# Patient Record
Sex: FEMALE | Race: WHITE | NOT HISPANIC OR LATINO
[De-identification: names, ages, dates, MRNs, and addresses within clinical notes are randomized per-mention and may not be internally consistent; named-entity substitution may affect disease eponyms.]

---

## 2021-03-12 PROBLEM — Z00.00 ENCOUNTER FOR PREVENTIVE HEALTH EXAMINATION: Status: ACTIVE | Noted: 2021-03-12

## 2021-03-29 ENCOUNTER — RESULT CHARGE (OUTPATIENT)
Age: 31
End: 2021-03-29

## 2021-03-29 ENCOUNTER — APPOINTMENT (OUTPATIENT)
Age: 31
End: 2021-03-29
Payer: MEDICAID

## 2021-03-29 VITALS
SYSTOLIC BLOOD PRESSURE: 124 MMHG | HEIGHT: 64 IN | DIASTOLIC BLOOD PRESSURE: 78 MMHG | BODY MASS INDEX: 30.39 KG/M2 | WEIGHT: 178 LBS

## 2021-03-29 DIAGNOSIS — Z82.49 FAMILY HISTORY OF ISCHEMIC HEART DISEASE AND OTHER DISEASES OF THE CIRCULATORY SYSTEM: ICD-10-CM

## 2021-03-29 DIAGNOSIS — Z80.3 FAMILY HISTORY OF MALIGNANT NEOPLASM OF BREAST: ICD-10-CM

## 2021-03-29 DIAGNOSIS — R35.0 FREQUENCY OF MICTURITION: ICD-10-CM

## 2021-03-29 DIAGNOSIS — Z80.0 FAMILY HISTORY OF MALIGNANT NEOPLASM OF DIGESTIVE ORGANS: ICD-10-CM

## 2021-03-29 DIAGNOSIS — R35.1 NOCTURIA: ICD-10-CM

## 2021-03-29 DIAGNOSIS — Z86.59 PERSONAL HISTORY OF OTHER MENTAL AND BEHAVIORAL DISORDERS: ICD-10-CM

## 2021-03-29 DIAGNOSIS — Z87.09 PERSONAL HISTORY OF OTHER DISEASES OF THE RESPIRATORY SYSTEM: ICD-10-CM

## 2021-03-29 DIAGNOSIS — E28.2 POLYCYSTIC OVARIAN SYNDROME: ICD-10-CM

## 2021-03-29 DIAGNOSIS — N85.4 MALPOSITION OF UTERUS: ICD-10-CM

## 2021-03-29 DIAGNOSIS — N39.0 URINARY TRACT INFECTION, SITE NOT SPECIFIED: ICD-10-CM

## 2021-03-29 DIAGNOSIS — Z83.3 FAMILY HISTORY OF DIABETES MELLITUS: ICD-10-CM

## 2021-03-29 DIAGNOSIS — Z83.49 FAMILY HISTORY OF OTHER ENDOCRINE, NUTRITIONAL AND METABOLIC DISEASES: ICD-10-CM

## 2021-03-29 DIAGNOSIS — R30.0 DYSURIA: ICD-10-CM

## 2021-03-29 DIAGNOSIS — Z86.39 PERSONAL HISTORY OF OTHER ENDOCRINE, NUTRITIONAL AND METABOLIC DISEASE: ICD-10-CM

## 2021-03-29 LAB
BILIRUB UR QL STRIP: NEGATIVE
CLARITY UR: CLEAR
COLLECTION METHOD: NORMAL
GLUCOSE UR-MCNC: NEGATIVE
HCG UR QL: 0.2 EU/DL
HGB UR QL STRIP.AUTO: NEGATIVE
KETONES UR-MCNC: NEGATIVE
LEUKOCYTE ESTERASE UR QL STRIP: NEGATIVE
NITRITE UR QL STRIP: NEGATIVE
PH UR STRIP: 7
PROT UR STRIP-MCNC: NEGATIVE
SP GR UR STRIP: 1.01

## 2021-03-29 PROCEDURE — 51701 INSERT BLADDER CATHETER: CPT

## 2021-03-29 PROCEDURE — 99203 OFFICE O/P NEW LOW 30 MIN: CPT | Mod: 25

## 2021-03-29 PROCEDURE — 99072 ADDL SUPL MATRL&STAF TM PHE: CPT

## 2021-03-29 RX ORDER — NITROFURANTOIN MACROCRYSTALS 50 MG/1
50 CAPSULE ORAL
Qty: 30 | Refills: 5 | Status: ACTIVE | COMMUNITY
Start: 2021-03-29 | End: 1900-01-01

## 2021-03-29 RX ORDER — SERTRALINE HYDROCHLORIDE 100 MG/1
100 TABLET, FILM COATED ORAL
Qty: 45 | Refills: 0 | Status: ACTIVE | COMMUNITY
Start: 2021-03-25

## 2021-03-29 RX ORDER — FEXOFENADINE HYDROCHLORIDE AND PSEUDOEPHEDRINE HYDROCHLORIDE 180; 240 MG/1; MG/1
TABLET, FILM COATED, EXTENDED RELEASE ORAL
Refills: 0 | Status: ACTIVE | COMMUNITY

## 2021-03-29 RX ORDER — CRANBERRY FRUIT EXTRACT 650 MG
CAPSULE ORAL
Refills: 0 | Status: ACTIVE | COMMUNITY

## 2021-03-29 NOTE — PHYSICAL EXAM
[Chaperone Present] : A chaperone was present in the examining room during all aspects of the physical examination [No Acute Distress] : in no acute distress [Well developed] : well developed [Well Nourished] : ~L well nourished [Oriented x3] : oriented to person, place, and time [Normal Memory] : ~T memory was ~L unimpaired [Normal Mood/Affect] : mood and affect are normal [Warm and Dry] : was warm and dry to touch [Normal Gait] : gait was normal [Labia Majora] : were normal [Labia Minora] : were normal [Normal Appearance] : general appearance was normal [No Bleeding] : there was no active vaginal bleeding [2] : 2 [Normal] : no abnormalities [Cough] : no cough [Tenderness] : ~T no ~M abdominal tenderness observed [Distended] : not distended [Inguinal LAD] : no adenopathy was noted in the inguinal lymph nodes [FreeTextEntry3] : neg CST [FreeTextEntry4] : no qtip tenderness, no levator spasm  [de-identified] : no prolapse

## 2021-03-29 NOTE — HISTORY OF PRESENT ILLNESS
[Cystocele (Obstetric)] : no [Vaginal Wall Prolapse] : no [Rectal Prolapse] : no [Urge Incontinence Of Urine] : no [Unable To Restrain Bowel Movement] : no [x1] : nocturia once nightly [Feelings Of Urinary Urgency] : no [Pain During Urination (Dysuria)] : no [Urinary Tract Infection] : mild [Constipation Obstructed Defecation] : no [] : no [Incomplete Emptying Of Stool] : no [Pelvic Pain] : no [Vaginal Pain] : no [Vulvar Pain] : no [Bladder Pain] : no [FreeTextEntry1] : \par Madelyn presents for evaluation of recurrent UTI, she reports she has UTI symptoms including frequency/urgency she will take azo and it will go away, a few days later she will feel like she gets one, she reports a small amt of dyusria, usially if symptoms increase she takes azo/cranberry and it goes away, she reports that when she does not feel like she has a UTI sometimes she has burning/urgency, she reports frequency, denies incontinence, voids one time/hour, she denies hematuria, denies incomplete emptying, she always hovers when she urinates and squats, she is always in a rush and pushing urine out, she reports the last few days she has been taking more time to void, denies pelvic pain, denies vaginal bulge, denies leakage of stool, denies constipation, sometimes intercourse is painful on initial penetration,she does have a history of PCOS, watches cycle for contraception \par \par h/o depression\par

## 2021-03-29 NOTE — DISCUSSION/SUMMARY
[FreeTextEntry1] : \par Madelyn presents with post coital recurrent UTIS, exam normal, PVR normal. She does not have culture results available. We discussed etiology and management of recurrent urinary tract infections. We discussed behavioral modifications including increased oral intake, cranberry tablets, wiping front to back, d mannose. We discussed management of recurrent UTIs with post coital antibiotic prophylaxis.  We discussed that is she is symptomatic I prefer for catheterized specimen however if she cannot come to the office, discussed u/a and culture prior to antibiotic treatment. She will start macrodantin ppx, discussed bladder sensitive diet, and return in 2 months. All questions were answered. \par \par [] macrodantin ppx 50mg\par [] bladder sensitive diet\par [] return in 2 months

## 2021-06-01 ENCOUNTER — APPOINTMENT (OUTPATIENT)
Dept: UROGYNECOLOGY | Facility: CLINIC | Age: 31
End: 2021-06-01
Payer: MEDICAID

## 2021-06-01 VITALS — DIASTOLIC BLOOD PRESSURE: 70 MMHG | SYSTOLIC BLOOD PRESSURE: 122 MMHG

## 2021-06-01 DIAGNOSIS — R10.2 PELVIC AND PERINEAL PAIN: ICD-10-CM

## 2021-06-01 DIAGNOSIS — N39.0 URINARY TRACT INFECTION, SITE NOT SPECIFIED: ICD-10-CM

## 2021-06-01 DIAGNOSIS — R39.15 URGENCY OF URINATION: ICD-10-CM

## 2021-06-01 PROCEDURE — 99213 OFFICE O/P EST LOW 20 MIN: CPT

## 2021-06-01 RX ORDER — NITROFURANTOIN MACROCRYSTALS 50 MG/1
50 CAPSULE ORAL
Qty: 30 | Refills: 5 | Status: ACTIVE | COMMUNITY
Start: 2021-06-01 | End: 1900-01-01

## 2021-06-01 NOTE — DISCUSSION/SUMMARY
[FreeTextEntry1] : \par Madelyn presents for f/u on recurrent UTI, doing well with macrodantin ppx 50mg and bladder sensitive diet, will cont for 6 months total and then f/u, if doing well will d/c.  all questions were answered.\par \par

## 2021-06-01 NOTE — HISTORY OF PRESENT ILLNESS
[Cystocele (Obstetric)] : no [Vaginal Wall Prolapse] : no [Rectal Prolapse] : no [Urge Incontinence Of Urine] : no [Unable To Restrain Bowel Movement] : no [x1] : nocturia once nightly [Feelings Of Urinary Urgency] : no [Pain During Urination (Dysuria)] : no [Urinary Tract Infection] : no [Constipation Obstructed Defecation] : no [] : no [Incomplete Emptying Of Stool] : no [Pelvic Pain] : no [Vaginal Pain] : no [Vulvar Pain] : no [Bladder Pain] : no [FreeTextEntry1] : \torsten Joshi presents for f/u post coital UTIs, she was started on macrodantin ppx last visit and has improvement in her symptoms, she has also changed  her diet, she has no symptoms today and is very happy, she would like to cont with macrodantin

## 2022-02-28 ENCOUNTER — TRANSCRIPTION ENCOUNTER (OUTPATIENT)
Age: 32
End: 2022-02-28